# Patient Record
Sex: MALE | Race: WHITE | NOT HISPANIC OR LATINO | URBAN - METROPOLITAN AREA
[De-identification: names, ages, dates, MRNs, and addresses within clinical notes are randomized per-mention and may not be internally consistent; named-entity substitution may affect disease eponyms.]

---

## 2018-11-29 ENCOUNTER — OFFICE VISIT - HEALTHEAST (OUTPATIENT)
Dept: RHEUMATOLOGY | Facility: CLINIC | Age: 68
End: 2018-11-29

## 2018-11-29 ENCOUNTER — COMMUNICATION - HEALTHEAST (OUTPATIENT)
Dept: TELEHEALTH | Facility: CLINIC | Age: 68
End: 2018-11-29

## 2018-11-29 DIAGNOSIS — L08.9 INFLAMMATION OF TOE: ICD-10-CM

## 2018-11-29 DIAGNOSIS — M79.671 RIGHT FOOT PAIN: ICD-10-CM

## 2018-11-29 LAB
ALBUMIN SERPL-MCNC: 4.2 G/DL (ref 3.5–5)
ALT SERPL W P-5'-P-CCNC: 14 U/L (ref 0–45)
BASOPHILS # BLD AUTO: 0 THOU/UL (ref 0–0.2)
BASOPHILS NFR BLD AUTO: 0 % (ref 0–2)
C REACTIVE PROTEIN LHE: 1.2 MG/DL (ref 0–0.8)
CREAT SERPL-MCNC: 1.29 MG/DL (ref 0.7–1.3)
EOSINOPHIL # BLD AUTO: 0.2 THOU/UL (ref 0–0.4)
EOSINOPHIL NFR BLD AUTO: 3 % (ref 0–6)
ERYTHROCYTE [DISTWIDTH] IN BLOOD BY AUTOMATED COUNT: 11.4 % (ref 11–14.5)
ERYTHROCYTE [SEDIMENTATION RATE] IN BLOOD BY WESTERGREN METHOD: 2 MM/HR (ref 0–15)
GFR SERPL CREATININE-BSD FRML MDRD: 55 ML/MIN/1.73M2
HCT VFR BLD AUTO: 48.3 % (ref 40–54)
HGB BLD-MCNC: 16.9 G/DL (ref 14–18)
LYMPHOCYTES # BLD AUTO: 2 THOU/UL (ref 0.8–4.4)
LYMPHOCYTES NFR BLD AUTO: 33 % (ref 20–40)
MCH RBC QN AUTO: 30.9 PG (ref 27–34)
MCHC RBC AUTO-ENTMCNC: 35 G/DL (ref 32–36)
MCV RBC AUTO: 88 FL (ref 80–100)
MONOCYTES # BLD AUTO: 0.4 THOU/UL (ref 0–0.9)
MONOCYTES NFR BLD AUTO: 7 % (ref 2–10)
NEUTROPHILS # BLD AUTO: 3.6 THOU/UL (ref 2–7.7)
NEUTROPHILS NFR BLD AUTO: 57 % (ref 50–70)
PLATELET # BLD AUTO: 171 THOU/UL (ref 140–440)
PMV BLD AUTO: 7.6 FL (ref 7–10)
RBC # BLD AUTO: 5.48 MILL/UL (ref 4.4–6.2)
URATE SERPL-MCNC: 6.8 MG/DL (ref 3–8)
WBC: 6.2 THOU/UL (ref 4–11)

## 2018-11-29 ASSESSMENT — MIFFLIN-ST. JEOR: SCORE: 1849.42

## 2018-11-30 ENCOUNTER — COMMUNICATION - HEALTHEAST (OUTPATIENT)
Dept: ADMINISTRATIVE | Facility: CLINIC | Age: 68
End: 2018-11-30

## 2018-12-15 ENCOUNTER — COMMUNICATION - HEALTHEAST (OUTPATIENT)
Dept: RHEUMATOLOGY | Facility: CLINIC | Age: 68
End: 2018-12-15

## 2018-12-15 DIAGNOSIS — M79.671 RIGHT FOOT PAIN: ICD-10-CM

## 2018-12-15 DIAGNOSIS — L08.9 INFLAMMATION OF TOE: ICD-10-CM

## 2018-12-30 ENCOUNTER — COMMUNICATION - HEALTHEAST (OUTPATIENT)
Dept: RHEUMATOLOGY | Facility: CLINIC | Age: 68
End: 2018-12-30

## 2018-12-30 DIAGNOSIS — L08.9 INFLAMMATION OF TOE: ICD-10-CM

## 2018-12-30 DIAGNOSIS — M79.671 RIGHT FOOT PAIN: ICD-10-CM

## 2018-12-31 RX ORDER — COLCHICINE 0.6 MG/1
CAPSULE ORAL
Qty: 30 CAPSULE | Refills: 0 | Status: SHIPPED | OUTPATIENT
Start: 2018-12-31

## 2021-06-02 VITALS — HEIGHT: 70 IN | WEIGHT: 241 LBS | BODY MASS INDEX: 34.5 KG/M2

## 2021-06-16 PROBLEM — L08.9 INFLAMMATION OF TOE: Status: ACTIVE | Noted: 2018-11-29

## 2021-06-16 PROBLEM — M79.671 RIGHT FOOT PAIN: Status: ACTIVE | Noted: 2018-11-29

## 2021-06-22 NOTE — PROGRESS NOTES
ASSESSMENT AND PLAN:  Victorino Bernabe 68 y.o. male is seen here on 11/29/18 for evaluation of painful swollen right big toe.  The key question here is the etiology and characterization of this inflammatory response.  We discussed how gout can certainly present like this however and more in most patients initially this tends to be a monophasic phenomenon rather than a protracted episode the way he has described.  X-rays of the feet done today, personally reviewed the films, findings: Osteoarthritic change in the first MTPs no erosions are on the interphalangeal joints of the big toe on either side.  The alternative such as an infection in the nail plate or the joint were reviewed.  There is no this demonstratable effusion on ultrasound hands none was aspirated.  He has more of interstitial fluid.  We discussed various options besides a workup as noted one option is for him to be evaluated at podiatry no other one is to treated more clinically along the lines of gout, with modest dose of prednisone, colchicine.  We chose the latter.  He is going to take pictures on his cell phone in 7 days and send them across from [place].  We will meet here accordingly.  Diagnoses and all orders for this visit:    Inflammation of  right big toe  -     HM1(CBC and Differential)  -     Creatinine  -     ALT (SGPT)  -     Albumin  -     Uric Acid  -     Erythrocyte Sedimentation Rate  -     C-Reactive Protein  -     HM1 (CBC with Diff)  -     predniSONE (DELTASONE) 20 MG tablet; Take 20 mg by mouth daily for 15 days.  Dispense: 15 tablet; Refill: 0  -     colchicine (MITIGARE) 0.6 mg cap; Take 0.6 mg by mouth daily.  Dispense: 30 capsule; Refill: 0  -     XR Feet Bilateral 3 Or More VWS; Future; Expected date: 11/29/2018  -     XR Feet Bilateral 3 Or More VWS    Right foot pain  -     HM1(CBC and Differential)  -     Creatinine  -     ALT (SGPT)  -     Albumin  -     Uric Acid  -     Erythrocyte Sedimentation Rate  -     C-Reactive  Protein  -     HM1 (CBC with Diff)  -     predniSONE (DELTASONE) 20 MG tablet; Take 20 mg by mouth daily for 15 days.  Dispense: 15 tablet; Refill: 0  -     colchicine (MITIGARE) 0.6 mg cap; Take 0.6 mg by mouth daily.  Dispense: 30 capsule; Refill: 0  -     XR Feet Bilateral 3 Or More VWS; Future; Expected date: 11/29/2018  -     XR Feet Bilateral 3 Or More VWS      HISTORY OF PRESENTING ILLNESS:  Victorino Bernabe, 68 y.o., male is here for evaluation of painful right big toe.  This came on about a month ago.  He was taking his socks off he felt something stinging,  he thought he may have been bitten by insect or spider.  Soon after he discovered this the pain presented to a more severe level, 8 or 9/10, impairing his gait.  Over the next 7 days or so he took various supplements and some that he got from [employer], including tart cherry intake.  His pain symptoms improved significantly.  He was able to walk without pain.  He noted the pain would have improved perhaps to 3/10 level.  The redness however persisted.  A few days later, this past Tuesday, he had another flareup with pain worsening not quite to the same level perhaps 6/10.  He still has persistent redness.  He noted no other joint area similarly affected.  He has never had something like this before.  There is no history of trauma.  He describes himself otherwise in good health.  He did have kidney stones many years ago, the costal joints are unknown and he thinks may be calcium stones.  His GFR as tested in 2015 was minimally reduced.  There is no family history of gout.  He noted morning stiffness of no more than 5 minutes he does not smoke alcohol 2-3 drinks per week exercises regularly.  He has a history of hypertension.  There is no personal family history of psoriasis inflammatory bowel disease.  There is no family history of lupus rheumatoid arthritis ankylosing spondylitis.  Further historical information and ADL limitations as noted in the  "multidimensional health assessment questionnaire attached in the EMR. Rest of the 13 system ROS is negative.     ALLERGIES:Patient has no known allergies.    PAST MEDICAL/ACTIVE PROBLEMS/MEDICATION/ FAMILY HISTORY/SOCIAL DATA:  The patient has a family history of  No past medical history on file.  Social History     Tobacco Use   Smoking Status Never Smoker   Smokeless Tobacco Never Used     There is no problem list on file for this patient.    Current Outpatient Medications   Medication Sig Dispense Refill     MEN'S MULTI-VITAMIN ORAL Take by mouth.       No current facility-administered medications for this visit.        COMPREHENSIVE EXAMINATION:  Vitals:    11/29/18 1113   BP: (!) 180/100   Patient Site: Right Arm   Patient Position: Sitting   Cuff Size: Adult Large   Pulse: 84   Weight: (!) 241 lb (109.3 kg)   Height: 5' 9.69\" (1.77 m)     A well appearing alert oriented male. Vital data as noted above. His eyes without inflammation/scleromalacia. ENTwithout oral mucositis, thrush, nasal deformity, external ear redness, deformity. His neck is without lymphadenopathy and supple. Lungs normal sounds, no pleural rub. Heart auscultation normal rate, rhythm; no pericardial rub and murmurs. Abdomen soft, non tender, no organomegaly. Skin examined for heliotrope, malar area eruption, lupus pernio, periungual erythema, sclerodactyly, papules, erythema nodosum, purpura, nail pitting, onycholysis, and obvious psoriasis lesion. Neurological examination shows normal alertness, speech, facial symmetry, tone and power in upper and lower extremities, Tinel's and Phalen's at wrist and gait. The joint examination is performed for swelling, tenderness, warmth, erythema, and range of motion in the following joints: DIPs, PIPs, MCPs, wrists, first CMC's, elbows, shoulders, hips, knees, ankles, feet; spine for range of motion and paraspinal muscles for tenderness. The salient normal / abnormal findings are appended.  He has " hyperemia warmth mild tenderness of the right big toe surrounding the interphalangeal joint, encroaching to the base of the nail plate, the right first MTP is neither swollen nontender.  No fluctuation.  There is no definite tophaceous material seen.  I have examined his area of inflammation with the ultrasound.  There is no definite double contour sign.  As expected there was marked a Doppler effect suggestive of hyperemia. No joint fluid.    LAB / IMAGING DATA:  No results found for: ALT, ALBUMIN, CREATININE    No results found for: WBC, HGB, PLT    No results found for: YOLANDA, RF, SEDRATE

## 2021-06-26 ENCOUNTER — HEALTH MAINTENANCE LETTER (OUTPATIENT)
Age: 71
End: 2021-06-26

## 2021-10-16 ENCOUNTER — HEALTH MAINTENANCE LETTER (OUTPATIENT)
Age: 71
End: 2021-10-16

## 2022-07-17 ENCOUNTER — HEALTH MAINTENANCE LETTER (OUTPATIENT)
Age: 72
End: 2022-07-17

## 2022-09-25 ENCOUNTER — HEALTH MAINTENANCE LETTER (OUTPATIENT)
Age: 72
End: 2022-09-25

## 2023-07-22 ENCOUNTER — HEALTH MAINTENANCE LETTER (OUTPATIENT)
Age: 73
End: 2023-07-22